# Patient Record
Sex: FEMALE | Race: WHITE | NOT HISPANIC OR LATINO | URBAN - METROPOLITAN AREA
[De-identification: names, ages, dates, MRNs, and addresses within clinical notes are randomized per-mention and may not be internally consistent; named-entity substitution may affect disease eponyms.]

---

## 2024-09-03 PROBLEM — N64.82 HYPOMASTIA: Status: ACTIVE | Noted: 2024-09-03

## 2024-09-03 PROBLEM — E88.1 LIPODYSTROPHY: Status: ACTIVE | Noted: 2024-09-03

## 2024-09-05 ENCOUNTER — ANESTHESIA EVENT (OUTPATIENT)
Dept: PERIOP | Facility: HOSPITAL | Age: 34
End: 2024-09-05
Payer: SELF-PAY

## 2024-09-05 RX ORDER — FLUTICASONE PROPIONATE 50 MCG
1 SPRAY, SUSPENSION (ML) NASAL AS NEEDED
COMMUNITY

## 2024-09-05 RX ORDER — ALPRAZOLAM 0.5 MG/1
0.5 TABLET, ORALLY DISINTEGRATING ORAL
COMMUNITY

## 2024-09-05 RX ORDER — FERROUS SULFATE 325(65) MG
325 TABLET, DELAYED RELEASE (ENTERIC COATED) ORAL
COMMUNITY

## 2024-09-05 RX ORDER — ALBUTEROL SULFATE 90 UG/1
2 AEROSOL, METERED RESPIRATORY (INHALATION) EVERY 6 HOURS PRN
COMMUNITY

## 2024-09-05 RX ORDER — DEXTROAMPHETAMINE SACCHARATE, AMPHETAMINE ASPARTATE, DEXTROAMPHETAMINE SULFATE AND AMPHETAMINE SULFATE 2.5; 2.5; 2.5; 2.5 MG/1; MG/1; MG/1; MG/1
30 TABLET ORAL DAILY
COMMUNITY

## 2024-09-05 NOTE — PRE-PROCEDURE INSTRUCTIONS
Pre-Surgery Instructions:   Medication Instructions    albuterol (PROVENTIL HFA,VENTOLIN HFA) 90 mcg/act inhaler Uses PRN- OK to take day of surgery    ALPRAZolam (NIRAVAM) 0.5 MG dissolvable tablet Uses PRN- OK to take day of surgery    amphetamine-dextroamphetamine (ADDERALL, 10MG,) 10 mg tablet Hold day of surgery.    ferrous sulfate 325 (65 FE) MG EC tablet Hold day of surgery.    fluticasone (FLONASE) 50 mcg/act nasal spray Uses PRN- OK to take day of surgery    Medication instructions for day surgery reviewed. Please use only a sip of water to take your instructed medications. Avoid all over the counter vitamins, supplements and NSAIDS for one week prior to surgery per anesthesia guidelines. Tylenol is ok to take as needed.     You will receive a call one business day prior to surgery with an arrival time and hospital directions. If your surgery is scheduled on a Monday, the hospital will be calling you on the Friday prior to your surgery. If you have not heard from anyone by 8pm, please call the hospital supervisor through the hospital  at [phone]. ([place] [phone] or [place] [phone]).    Do not eat or drink anything after midnight the night before your surgery, including candy, mints, lifesavers, or chewing gum. Do not drink alcohol 24hrs before your surgery. Try not to smoke at least 24hrs before your surgery.       Follow the pre surgery showering instructions as listed in the “My Surgical Experience Booklet” or otherwise provided by your surgeon's office. Do not use a blade to shave the surgical area 1 week before surgery. It is okay to use a clean electric clippers up to 24 hours before surgery. Do not apply any lotions, creams, including makeup, cologne, deodorant, or perfumes after showering on the day of your surgery. Do not use dry shampoo, hair spray, hair gel, or any type of hair products.     No contact lenses, eye make-up, or artificial eyelashes. Remove nail polish,  including gel polish, and any artificial, gel, or acrylic nails if possible. Remove all jewelry including rings and body piercing jewelry.     Wear causal clothing that is easy to take on and off. Consider your type of surgery.    Keep any valuables, jewelry, piercings at home. Please bring any specially ordered equipment (sling, braces) if indicated.    Arrange for a responsible person to drive you to and from the hospital on the day of your surgery. Please confirm the visitor policy for the day of your procedure when you receive your phone call with an arrival time.     Call the surgeon's office with any new illnesses, exposures, or additional questions prior to surgery.    Please reference your “My Surgical Experience Booklet” for additional information to prepare for your upcoming surgery.    Pt has surgical soap.

## 2024-09-11 ENCOUNTER — ANESTHESIA (OUTPATIENT)
Dept: PERIOP | Facility: HOSPITAL | Age: 34
End: 2024-09-11
Payer: SELF-PAY

## 2024-09-11 ENCOUNTER — HOSPITAL ENCOUNTER (OUTPATIENT)
Facility: HOSPITAL | Age: 34
Setting detail: OUTPATIENT SURGERY
Discharge: HOME | End: 2024-09-11
Attending: SURGERY | Admitting: SURGERY
Payer: SELF-PAY

## 2024-09-11 VITALS
OXYGEN SATURATION: 100 % | DIASTOLIC BLOOD PRESSURE: 58 MMHG | SYSTOLIC BLOOD PRESSURE: 90 MMHG | BODY MASS INDEX: 23.92 KG/M2 | RESPIRATION RATE: 18 BRPM | WEIGHT: 130 LBS | HEART RATE: 64 BPM | TEMPERATURE: 97.5 F | HEIGHT: 62 IN

## 2024-09-11 PROBLEM — J45.909 ASTHMA: Status: ACTIVE | Noted: 2024-09-11

## 2024-09-11 LAB
EXT PREGNANCY TEST URINE: NEGATIVE
EXT. CONTROL: NORMAL

## 2024-09-11 PROCEDURE — 81025 URINE PREGNANCY TEST: CPT | Performed by: SURGERY

## 2024-09-11 RX ORDER — GLYCOPYRROLATE 0.2 MG/ML
INJECTION INTRAMUSCULAR; INTRAVENOUS AS NEEDED
Status: DISCONTINUED | OUTPATIENT
Start: 2024-09-11 | End: 2024-09-11

## 2024-09-11 RX ORDER — ONDANSETRON 2 MG/ML
INJECTION INTRAMUSCULAR; INTRAVENOUS AS NEEDED
Status: DISCONTINUED | OUTPATIENT
Start: 2024-09-11 | End: 2024-09-11

## 2024-09-11 RX ORDER — FENTANYL CITRATE/PF 50 MCG/ML
25 SYRINGE (ML) INJECTION
Status: DISCONTINUED | OUTPATIENT
Start: 2024-09-11 | End: 2024-09-11 | Stop reason: HOSPADM

## 2024-09-11 RX ORDER — KETOROLAC TROMETHAMINE 30 MG/ML
INJECTION, SOLUTION INTRAMUSCULAR; INTRAVENOUS AS NEEDED
Status: DISCONTINUED | OUTPATIENT
Start: 2024-09-11 | End: 2024-09-11

## 2024-09-11 RX ORDER — CEFAZOLIN SODIUM 2 G/50ML
2000 SOLUTION INTRAVENOUS ONCE
Status: COMPLETED | OUTPATIENT
Start: 2024-09-11 | End: 2024-09-11

## 2024-09-11 RX ORDER — FENTANYL CITRATE 50 UG/ML
INJECTION, SOLUTION INTRAMUSCULAR; INTRAVENOUS AS NEEDED
Status: DISCONTINUED | OUTPATIENT
Start: 2024-09-11 | End: 2024-09-11

## 2024-09-11 RX ORDER — HYDROCODONE BITARTRATE AND ACETAMINOPHEN 5; 325 MG/1; MG/1
2 TABLET ORAL EVERY 4 HOURS PRN
Status: DISCONTINUED | OUTPATIENT
Start: 2024-09-11 | End: 2024-09-11 | Stop reason: HOSPADM

## 2024-09-11 RX ORDER — SODIUM CHLORIDE, SODIUM LACTATE, POTASSIUM CHLORIDE, CALCIUM CHLORIDE 600; 310; 30; 20 MG/100ML; MG/100ML; MG/100ML; MG/100ML
INJECTION, SOLUTION INTRAVENOUS CONTINUOUS PRN
Status: DISCONTINUED | OUTPATIENT
Start: 2024-09-11 | End: 2024-09-11

## 2024-09-11 RX ORDER — ACETAMINOPHEN 10 MG/ML
INJECTION, SOLUTION INTRAVENOUS AS NEEDED
Status: DISCONTINUED | OUTPATIENT
Start: 2024-09-11 | End: 2024-09-11

## 2024-09-11 RX ORDER — DEXAMETHASONE SODIUM PHOSPHATE 10 MG/ML
INJECTION, SOLUTION INTRAMUSCULAR; INTRAVENOUS AS NEEDED
Status: DISCONTINUED | OUTPATIENT
Start: 2024-09-11 | End: 2024-09-11

## 2024-09-11 RX ORDER — SODIUM CHLORIDE, SODIUM LACTATE, POTASSIUM CHLORIDE, CALCIUM CHLORIDE 600; 310; 30; 20 MG/100ML; MG/100ML; MG/100ML; MG/100ML
125 INJECTION, SOLUTION INTRAVENOUS CONTINUOUS
Status: DISCONTINUED | OUTPATIENT
Start: 2024-09-11 | End: 2024-09-11 | Stop reason: HOSPADM

## 2024-09-11 RX ORDER — KETOROLAC TROMETHAMINE 30 MG/ML
30 INJECTION, SOLUTION INTRAMUSCULAR; INTRAVENOUS ONCE
Status: DISCONTINUED | OUTPATIENT
Start: 2024-09-11 | End: 2024-09-11 | Stop reason: HOSPADM

## 2024-09-11 RX ORDER — SODIUM CHLORIDE, SODIUM LACTATE, POTASSIUM CHLORIDE, CALCIUM CHLORIDE 600; 310; 30; 20 MG/100ML; MG/100ML; MG/100ML; MG/100ML
75 INJECTION, SOLUTION INTRAVENOUS CONTINUOUS
Status: DISCONTINUED | OUTPATIENT
Start: 2024-09-11 | End: 2024-09-11 | Stop reason: HOSPADM

## 2024-09-11 RX ORDER — LIDOCAINE HYDROCHLORIDE 10 MG/ML
INJECTION, SOLUTION EPIDURAL; INFILTRATION; INTRACAUDAL; PERINEURAL AS NEEDED
Status: DISCONTINUED | OUTPATIENT
Start: 2024-09-11 | End: 2024-09-11

## 2024-09-11 RX ORDER — ONDANSETRON 2 MG/ML
4 INJECTION INTRAMUSCULAR; INTRAVENOUS ONCE AS NEEDED
Status: DISCONTINUED | OUTPATIENT
Start: 2024-09-11 | End: 2024-09-11 | Stop reason: HOSPADM

## 2024-09-11 RX ORDER — BUPIVACAINE HYDROCHLORIDE AND EPINEPHRINE 2.5; 5 MG/ML; UG/ML
INJECTION, SOLUTION EPIDURAL; INFILTRATION; INTRACAUDAL; PERINEURAL AS NEEDED
Status: DISCONTINUED | OUTPATIENT
Start: 2024-09-11 | End: 2024-09-11 | Stop reason: HOSPADM

## 2024-09-11 RX ORDER — NEOSTIGMINE METHYLSULFATE 1 MG/ML
INJECTION INTRAVENOUS AS NEEDED
Status: DISCONTINUED | OUTPATIENT
Start: 2024-09-11 | End: 2024-09-11

## 2024-09-11 RX ORDER — MIDAZOLAM HYDROCHLORIDE 2 MG/2ML
INJECTION, SOLUTION INTRAMUSCULAR; INTRAVENOUS AS NEEDED
Status: DISCONTINUED | OUTPATIENT
Start: 2024-09-11 | End: 2024-09-11

## 2024-09-11 RX ORDER — ROCURONIUM BROMIDE 10 MG/ML
INJECTION, SOLUTION INTRAVENOUS AS NEEDED
Status: DISCONTINUED | OUTPATIENT
Start: 2024-09-11 | End: 2024-09-11

## 2024-09-11 RX ORDER — PROPOFOL 10 MG/ML
INJECTION, EMULSION INTRAVENOUS AS NEEDED
Status: DISCONTINUED | OUTPATIENT
Start: 2024-09-11 | End: 2024-09-11

## 2024-09-11 RX ORDER — ONDANSETRON 8 MG/1
8 TABLET, ORALLY DISINTEGRATING ORAL ONCE
Status: COMPLETED | OUTPATIENT
Start: 2024-09-11 | End: 2024-09-11

## 2024-09-11 RX ORDER — HYDROMORPHONE HCL/PF 1 MG/ML
SYRINGE (ML) INJECTION AS NEEDED
Status: DISCONTINUED | OUTPATIENT
Start: 2024-09-11 | End: 2024-09-11

## 2024-09-11 RX ORDER — MAGNESIUM HYDROXIDE 1200 MG/15ML
LIQUID ORAL AS NEEDED
Status: DISCONTINUED | OUTPATIENT
Start: 2024-09-11 | End: 2024-09-11 | Stop reason: HOSPADM

## 2024-09-11 RX ORDER — MINERAL OIL
OIL (ML) MISCELLANEOUS AS NEEDED
Status: DISCONTINUED | OUTPATIENT
Start: 2024-09-11 | End: 2024-09-11 | Stop reason: HOSPADM

## 2024-09-11 RX ORDER — HYDROCODONE BITARTRATE AND ACETAMINOPHEN 5; 325 MG/1; MG/1
1 TABLET ORAL EVERY 4 HOURS PRN
Status: DISCONTINUED | OUTPATIENT
Start: 2024-09-11 | End: 2024-09-11 | Stop reason: HOSPADM

## 2024-09-11 RX ORDER — PROPOFOL 10 MG/ML
INJECTION, EMULSION INTRAVENOUS CONTINUOUS PRN
Status: DISCONTINUED | OUTPATIENT
Start: 2024-09-11 | End: 2024-09-11

## 2024-09-11 RX ADMIN — GLYCOPYRROLATE 0.4 MG: 0.2 INJECTION, SOLUTION INTRAMUSCULAR; INTRAVENOUS at 10:47

## 2024-09-11 RX ADMIN — CEFAZOLIN SODIUM 2000 MG: 2 SOLUTION INTRAVENOUS at 07:37

## 2024-09-11 RX ADMIN — SODIUM CHLORIDE, SODIUM LACTATE, POTASSIUM CHLORIDE, AND CALCIUM CHLORIDE: .6; .31; .03; .02 INJECTION, SOLUTION INTRAVENOUS at 07:28

## 2024-09-11 RX ADMIN — ROCURONIUM BROMIDE 10 MG: 50 INJECTION, SOLUTION INTRAVENOUS at 09:27

## 2024-09-11 RX ADMIN — ACETAMINOPHEN 1000 MG: 10 INJECTION INTRAVENOUS at 10:46

## 2024-09-11 RX ADMIN — HYDROMORPHONE HYDROCHLORIDE 1 MG: 1 INJECTION, SOLUTION INTRAMUSCULAR; INTRAVENOUS; SUBCUTANEOUS at 10:50

## 2024-09-11 RX ADMIN — NEOSTIGMINE METHYLSULFATE 3 MG: 1 INJECTION INTRAVENOUS at 10:47

## 2024-09-11 RX ADMIN — SODIUM CHLORIDE, SODIUM LACTATE, POTASSIUM CHLORIDE, AND CALCIUM CHLORIDE: .6; .31; .03; .02 INJECTION, SOLUTION INTRAVENOUS at 10:29

## 2024-09-11 RX ADMIN — KETOROLAC TROMETHAMINE 30 MG: 30 INJECTION, SOLUTION INTRAMUSCULAR; INTRAVENOUS at 10:46

## 2024-09-11 RX ADMIN — PROPOFOL 80 MCG/KG/MIN: 10 INJECTION, EMULSION INTRAVENOUS at 08:38

## 2024-09-11 RX ADMIN — SODIUM CHLORIDE, SODIUM LACTATE, POTASSIUM CHLORIDE, AND CALCIUM CHLORIDE 125 ML/HR: .6; .31; .03; .02 INJECTION, SOLUTION INTRAVENOUS at 06:08

## 2024-09-11 RX ADMIN — FENTANYL CITRATE 50 MCG: 50 INJECTION, SOLUTION INTRAMUSCULAR; INTRAVENOUS at 07:31

## 2024-09-11 RX ADMIN — ONDANSETRON 4 MG: 2 INJECTION INTRAMUSCULAR; INTRAVENOUS at 10:43

## 2024-09-11 RX ADMIN — LIDOCAINE HYDROCHLORIDE 50 MG: 10 INJECTION, SOLUTION EPIDURAL; INFILTRATION; INTRACAUDAL; PERINEURAL at 07:31

## 2024-09-11 RX ADMIN — SODIUM CHLORIDE, SODIUM LACTATE, POTASSIUM CHLORIDE, AND CALCIUM CHLORIDE: .6; .31; .03; .02 INJECTION, SOLUTION INTRAVENOUS at 08:16

## 2024-09-11 RX ADMIN — ROCURONIUM BROMIDE 20 MG: 50 INJECTION, SOLUTION INTRAVENOUS at 08:32

## 2024-09-11 RX ADMIN — ONDANSETRON 8 MG: 8 TABLET, ORALLY DISINTEGRATING ORAL at 14:13

## 2024-09-11 RX ADMIN — SODIUM CHLORIDE, SODIUM LACTATE, POTASSIUM CHLORIDE, AND CALCIUM CHLORIDE: .6; .31; .03; .02 INJECTION, SOLUTION INTRAVENOUS at 09:06

## 2024-09-11 RX ADMIN — DEXAMETHASONE SODIUM PHOSPHATE 10 MG: 10 INJECTION, SOLUTION INTRAMUSCULAR; INTRAVENOUS at 07:45

## 2024-09-11 RX ADMIN — ROCURONIUM BROMIDE 50 MG: 50 INJECTION, SOLUTION INTRAVENOUS at 07:31

## 2024-09-11 RX ADMIN — MIDAZOLAM 2 MG: 1 INJECTION INTRAMUSCULAR; INTRAVENOUS at 07:28

## 2024-09-11 RX ADMIN — PROPOFOL 200 MG: 10 INJECTION, EMULSION INTRAVENOUS at 07:31

## 2024-09-11 RX ADMIN — FENTANYL CITRATE 50 MCG: 50 INJECTION, SOLUTION INTRAMUSCULAR; INTRAVENOUS at 08:17

## 2024-09-11 NOTE — INTERVAL H&P NOTE
H&P reviewed. After examining the patient I find no changes in the patients condition since the H&P had been written.    Vitals:    09/11/24 0611   BP: 118/80   Pulse: 72   Resp: 18   Temp: 97.8 °F (36.6 °C)   SpO2: 100%

## 2024-09-11 NOTE — DISCHARGE INSTR - AVS FIRST PAGE
[facility]       [facility], [address] [place] [address], [address], [place] [address]   P [phone]/F [phone]/ [employer]        Home Instructions for the following procedures:   Abdominoplasty, Bodylift, Panniculectomy, Thigh lift       Please call the office today to schedule a post operative visit, and tell the office staff that your doctor needs to see you in our office in 7-10 days.       Use your pain medication as instructed. Pain medication may cause constipation.  Take something over the counter such as Senakot, or Colace TWICE daily while on pain medication. You want to make sure that you take the pain medication with food, to avoid getting an upset stomach. Do not drink alcohol while taking the pain medications.    Call your doctor if you notice any excessive swelling or bleeding.    Exercise calf muscles every hour. Extend your foot up and own at the ankle several times. Getting our of bed after surgery and walking is important to your health to prevent blood clots.    Elevate both the head and foot of the bed with two to three pillows. This will relieve tension on the suture line. Many patients choose to recover in a recliner for the first few days to help with putting strain on the abdominal area.    You may feel the need to walk in the bent over position. Try to stand up straight. It is not uncommon to develop low back pain when you walk in the bent-over position. This discomfort should subside once you are standing upright again.    Drink plenty of liquids. Your urine should appear clear. If your urine is yellow you need to drink more fluids    Do not smoke or use nicotine products for 2 months after your procedure. Any smoking or nicotine use may cause the skin of the abdomen to turn black and die off.    Do not lift anything that weights more then 10 pounds for about 4 weeks after the day of your surgery unless instructed by your  doctor.    You may remove dressings and shower the next day unless otherwise instructed by your doctor. IF you were given a binder/garment continue wearing your binder/garment at all times, except when showering or washing the garment. IF you are wearing a binder, sometimes patients are more comfortable wearing an old T-shirt underneath the binder. SOME patient may not have a binder, if this is the case, you will be placed into a garment when the drains are removed.    If you have drains, leaking around the drain site may occur. THIS IS NORMAL. You may apply a gauze sponge around the area to help with the drainage. The hospital staff will show you how to take care of the drains. Also, you will need to keep a record of the amount of drainage from each drain. Each drain will drain DIFFERENT amounts of fluid, THIS IS NORMAL.    It is not uncommon to have some serous fluid (yellowish, straw colored) drainage from your incisions or string-like particles in your drainage canisters.    You may have serous fluid drain from around the belly button. THIS IS NORMAL.  You may also have a small incision between your butt cheek for drainage due to liposucion. This drainage will stop within 24-48 hours. The opening will close on its own.    13. I recommend take 1-2 protein shakes per day with recovering to help with healing. If you were on any weight loss or appetite suppression medication prior to surgery, I recommend 2        protein shakes per day for 4 weeks after surgery.      Do not hesitate to call the office at [phone] if you have any questions about your surgery. The nursing staff will be glad to assist you in any possible way. If it is necessary for you to contract a doctor when the office is closed or on the weekend, please call [phone] and it will direct you to the answering service.   A physician will contact you to assist you with any problems or questions.

## 2024-09-11 NOTE — NURSING NOTE
Pt tolerated diet, attempted to get OOB, but became nauseated and dizzy, returned to bed, resting, Friend at bedside. Drain care instructions given to friend, who returned demonstration, and how to document on the drain sheet.

## 2024-09-11 NOTE — OP NOTE
OPERATIVE REPORT  PATIENT NAME: Miryam Walker    :  1990  MRN: [id number]  Pt Location:  OR ROOM 11    SURGERY DATE: 2024    Surgeons and Role:     * Quique Varela MD - Primary     * Kathleen Ryan - Assisting    Preop Diagnosis:  Excess skin of abdomen [L98.7]      Post OP diagnosis : same    Procedure(s):  ABDOMINOPLASTY    Specimen(s):  * No specimens in log *    Estimated Blood Loss:   Minimal    Drains:  Closed/Suction Drain Right Abdomen Bulb 15 Fr. (Active)   Number of days: 0       Closed/Suction Drain Left Abdomen Bulb 15 Fr. (Active)   Number of days: 0       [REMOVED] Urethral Catheter Latex;Straight-tip 16 Fr. (Removed)   Number of days: 0       Anesthesia Type:   General    Operative Indications:  Excess skin of abdomen [L98.7]      Operative Findings:   none      Complications:   None    Procedure and Technique:  The patient was properly identified in the operating room, initially    intubated by anesthesia and placed in the prone position with adequate    padding support.      We went ahead and tumesced the flank area for  2 liter of our tumescent    solution and suctioned the flank for 2 liter of fat. The stab    incisions were closed with 4-0 PDS and a Dermabond dressing.      We then flipped the patient back into the supine position and    reprepped and redraped  the abdominal area.    We made an incision in the suprapubic line hip to hip and elevated the skin off   of the muscle layer dissecting all the way up to the xiphoid process. We then   went ahead and circumscribed the umbilicus, but left it attached to the   abdominal wall. We plicated the midline of the abdomen with both interrupted   and running 1 Prolene suture.    We injected 40 mL of 0.25% Marcaine with epinephrine into the abdominal    fascia. We pulled the excess skin inferiorly toward the feet.  We then went ahead and tumesced the anterior abdominal skin and lateral sides with 0.5 liters of our tumescent solution.  This  was then suction with a #3 and 4 cannula for 0.5 liters of fat.  Once we had good contour , the excess skin was then pulled inferiorly toward the feet.  The    excess skin was then removed with a #15 blade and electrocautery. We    placed 2 15-Occitan Bharat drain into the abdominal defect and pulled it    out through the lateral aspect of our incision line.  We placed progressive tension sutures anchoring the external fascia to [place]'s with 2-0 PDS.  The drain was    held in place with 3-0 nylon suture. We then went ahead and closed our    deep fascia with 2-0 Vicryl, 2-0 PDS, deep dermis of 2-0 Vicryl, 3-0    PDS, a running subcuticular 3-0 Monocryl stitch and a Dermabond    dressing. A new opening was made through the abdominal wall skin where    the umbilicus was pulled through. The umbilicus was sutured in place    with 2-0 PDS and 4-0 chromic sutures.     The patient was then Dermabonded, awakened from surgery and taken to the recovery room in stable condition.      All counts were correct at the end of the procedure.    I was present for the entire procedure.    Patient Disposition:  PACU              SIGNATURE: Quique Varela MD  DATE: September 11, 2024  TIME: 11:17 AM

## 2024-09-11 NOTE — DISCHARGE SUMMARY
Discharge Summary - Plastic Surgery   Name: Miryam Walker 34 y.o. 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  Reason for Admission:  Miryam Walker was electively admitted to undergo the above named procedure on 9/11/2024 as an outpatient.  Hospital Course:  Patient underwent the above named procedure on the day of admission without complications. They were discharged home the same day.  Disposition:  To home in care of self and family.  Condition:  Good  Follow up:  Patient with follow up in the office with Dr. Quique Varela MD in 1 week(s) or as scheduled per his office  Quique Varela MD  9/11/2024 7:04 AM

## 2024-09-11 NOTE — ANESTHESIA POSTPROCEDURE EVALUATION
Post-Op Assessment Note    CV Status:  Stable    Pain management: adequate       Mental Status:  Alert and awake   Hydration Status:  Euvolemic   PONV Controlled:  Controlled   Airway Patency:  Patent     Post Op Vitals Reviewed: Yes    No anethesia notable event occurred.    Staff: CRNA               /77 (09/11/24 1110)    Temp 97.5 °F (36.4 °C) (09/11/24 1110)    Pulse (!) 127 (09/11/24 1110)   Resp 18 (09/11/24 1110)    SpO2 99 % (09/11/24 1110)

## 2024-09-11 NOTE — NURSING NOTE
"Patient getting anxious. Just wants to go home. \"I want to see my baby\". Lips were pale upon first encounter with patient. Now more pink in color. Complaint of being cold. Blankets given. Dressings to surgical slit between upper buttocks. Drainage expected. Left via wheelchair to lobby with staff member.   "

## 2024-09-11 NOTE — ANESTHESIA PREPROCEDURE EVALUATION
Procedure:  ABDOMINOPLASTY (Abdomen)    Relevant Problems   PULMONARY   (+) Asthma        Physical Exam    Airway    Mallampati score: II  TM Distance: >3 FB  Neck ROM: full     Dental   No notable dental hx     Cardiovascular  Cardiovascular exam normal    Pulmonary  Pulmonary exam normal     Other Findings  post-pubertal.      Anesthesia Plan  ASA Score- 2     Anesthesia Type- general with ASA Monitors.         Additional Monitors:     Airway Plan: ETT.           Plan Factors-Exercise tolerance (METS): >4 METS.    Chart reviewed.   Existing labs reviewed.     Patient is not a current smoker. Patient not instructed to abstain from smoking on day of procedure. Patient did not smoke on day of surgery.            Induction- intravenous.    Postoperative Plan-         Informed Consent- Anesthetic plan and risks discussed with patient.  I personally reviewed this patient with the CRNA. Discussed and agreed on the Anesthesia Plan with the CRNA..

## (undated) DEVICE — SPONGE LAP 18 X 18 IN STRL RFD

## (undated) DEVICE — DISPOSABLE OR TOWEL: Brand: CARDINAL HEALTH

## (undated) DEVICE — TUBING LIPOSUCTION ASPIRATION 12FT STERILE

## (undated) DEVICE — STERILE POLYISOPRENE POWDER-FREE SURGICAL GLOVES: Brand: PROTEXIS

## (undated) DEVICE — SUT PROLENE 1 CT-1 30 IN 8425H

## (undated) DEVICE — NEEDLE BLUNT 18 G X 1 1/2IN

## (undated) DEVICE — EXOFIN PRECISION PEN HIGH VISCOSITY TOPICAL SKIN ADHESIVE: Brand: EXOFIN PRECISION PEN, 1G

## (undated) DEVICE — PAD GROUNDING DUAL ADULT

## (undated) DEVICE — BETHLEHEM UNIVERSAL BREAST PK: Brand: CARDINAL HEALTH

## (undated) DEVICE — SUT VICRYL 2-0 CT-1 27 IN J259H

## (undated) DEVICE — CHEST/BREAST DRAPE: Brand: CONVERTORS

## (undated) DEVICE — SUT PDS II 3-0 SH 27 IN Z316H

## (undated) DEVICE — SYRINGE 30ML LL

## (undated) DEVICE — SKIN MARKER DUAL TIP WITH RULER CAP, FLEXIBLE RULER AND LABELS: Brand: DEVON

## (undated) DEVICE — INTENDED FOR TISSUE SEPARATION, AND OTHER PROCEDURES THAT REQUIRE A SHARP SURGICAL BLADE TO PUNCTURE OR CUT.: Brand: BARD-PARKER ® SAFETYLOCK CARBON RIB-BACK BLADES

## (undated) DEVICE — SUT ETHILON 3-0 PS-1 18 IN 1663H

## (undated) DEVICE — NEPTUNE E-SEP SMOKE EVACUATION PENCIL, COATED, 70MM BLADE, PUSH BUTTON SWITCH: Brand: NEPTUNE E-SEP

## (undated) DEVICE — CRADLE EXTREMITY UNIVERSAL CONTOURED

## (undated) DEVICE — NEEDLE 25G X 1 1/2

## (undated) DEVICE — SUT PDS II 2-0 CT-1 27 IN Z339H

## (undated) DEVICE — DRAIN HUBLESS 15FR 3 1/16IN

## (undated) DEVICE — JACKSON-PRATT 100CC BULB RESERVOIR: Brand: CARDINAL HEALTH

## (undated) DEVICE — STANDARD SURGICAL GOWN, L: Brand: CONVERTORS

## (undated) DEVICE — DRAPE EQUIPMENT RF WAND

## (undated) DEVICE — CANNISTER WASTE IMPLOSION PROOF

## (undated) DEVICE — SCD SEQUENTIAL COMPRESSION COMFORT SLEEVE MEDIUM KNEE LENGTH: Brand: KENDALL SCD

## (undated) DEVICE — POV-IOD SOLUTION 4OZ BT

## (undated) DEVICE — DRAPE SHEET THREE QUARTER

## (undated) DEVICE — TRAY FOLEY 16FR URIMETER SURESTEP

## (undated) DEVICE — TUBING INFILTRATION 9FT

## (undated) DEVICE — PREMIUM DRY TRAY LF: Brand: MEDLINE INDUSTRIES, INC.

## (undated) DEVICE — PACK UNIVERSAL DRAPES SUB-Q ICD

## (undated) DEVICE — SUT MONOCRYL 3-0 PS-2 27 IN Y427H